# Patient Record
Sex: FEMALE | Race: ASIAN | NOT HISPANIC OR LATINO | Employment: FULL TIME
[De-identification: names, ages, dates, MRNs, and addresses within clinical notes are randomized per-mention and may not be internally consistent; named-entity substitution may affect disease eponyms.]

---

## 2017-09-10 ENCOUNTER — HEALTH MAINTENANCE LETTER (OUTPATIENT)
Age: 47
End: 2017-09-10

## 2019-08-21 ENCOUNTER — OFFICE VISIT (OUTPATIENT)
Dept: URGENT CARE | Facility: URGENT CARE | Age: 49
End: 2019-08-21
Payer: COMMERCIAL

## 2019-08-21 VITALS
OXYGEN SATURATION: 99 % | SYSTOLIC BLOOD PRESSURE: 118 MMHG | DIASTOLIC BLOOD PRESSURE: 68 MMHG | TEMPERATURE: 97.4 F | HEART RATE: 71 BPM | RESPIRATION RATE: 16 BRPM

## 2019-08-21 DIAGNOSIS — R42 VERTIGO: Primary | ICD-10-CM

## 2019-08-21 PROCEDURE — 99203 OFFICE O/P NEW LOW 30 MIN: CPT | Performed by: FAMILY MEDICINE

## 2019-08-21 RX ORDER — MECLIZINE HYDROCHLORIDE 25 MG/1
25 TABLET ORAL 3 TIMES DAILY PRN
Qty: 15 TABLET | Refills: 0 | Status: SHIPPED | OUTPATIENT
Start: 2019-08-21

## 2019-08-21 ASSESSMENT — ENCOUNTER SYMPTOMS
FEVER: 0
WEAKNESS: 0
SEIZURES: 0
BLOOD IN STOOL: 0
LIGHT-HEADEDNESS: 0
NAUSEA: 1
SHORTNESS OF BREATH: 0
ABDOMINAL PAIN: 0
SPEECH DIFFICULTY: 0
SINUS PAIN: 0
PALPITATIONS: 0
SINUS PRESSURE: 0
VOMITING: 0
SORE THROAT: 0
NUMBNESS: 0

## 2019-08-21 NOTE — PATIENT INSTRUCTIONS
Symptomatic cares and OTC medications, only as discussed.    Meclizine (or Dramamine) as needed for dizziness/vertigo, only as discussed.    Avoid antihistamines (e.g. Benadryl) and driving within 8 hours of taking Meclizine, as discussed.    Follow-up if worsening symptoms or if not improving over the next 2-3 days.    Follow-up in the ER immediately if emergent symptoms develop, such as: chest pain, shortness of breath, stroke-like symptoms (e.g. weakness), or severe/worsening headache.

## 2019-08-21 NOTE — PROGRESS NOTES
"SUBJECTIVE:   Payton Piña is a 48 year old female presenting with a chief complaint of   Chief Complaint   Patient presents with     Urgent Care     Headache     headache poss vertigo X3 day      She is a new patient of [facility].    Vertigo HPI:      Patient has been experiencing vertigo since Monday (3 days ago), which is responsive to Dramamine.    Vertigo is described as \"the whole world moving or flipping\".    Vertigo is mild, 3-4/10 severity and constant today, but it was intermittent (related to movement and position changes) previously.    Vertigo is worse with repositioning maneuvers, as looked up on line.    Mild headache in the morning the past 3 days.    Head just feels a bit heavy currently, but patient denies current headache.    Patient works at [employer], with computer work required.    Patient drank a lot of caffeine Monday, currently trying to cut down on caffeine intake.    Patient had a few alcoholic beverages the night before her vertigo started.    No tobacco or drug use reported.    No CAD risk factors, outside of her Family History.    Review of Systems   Constitutional: Negative for fever.   HENT: Positive for tinnitus (Possible.). Negative for congestion, dental problem, ear pain, hearing loss, sinus pressure, sinus pain and sore throat.    Respiratory: Negative for shortness of breath.    Cardiovascular: Negative for chest pain, palpitations and leg swelling.   Gastrointestinal: Positive for nausea (Yesterday). Negative for abdominal pain, blood in stool and vomiting.   Skin: Negative for rash.   Neurological: Negative for seizures, syncope, speech difficulty, weakness, light-headedness and numbness.   LMP 8/9/2019.    Patient Active Problem List   Diagnosis     Osteopenia       History reviewed. No pertinent past medical history.    No Known Allergies    Family History   Problem Relation Age of Onset     Coronary Artery Disease Mother         Age 60+ stent     Breast Cancer " Mother         Age 60,  age 72.     Cerebrovascular Disease Maternal Grandmother        Current Outpatient Medications   Medication Sig Dispense Refill     Calcium and Vitamin D        melatonin 1 MG TABS tablet Take 1-3 tablets (1-3 mg) by mouth nightly as needed for sleep         Social History     Tobacco Use     Smoking status: Never Smoker     Smokeless tobacco: Never Used   Substance Use Topics     Alcohol use: Not on file     Comment: Up to 9 drinks/week.       OBJECTIVE  /68   Pulse 71   Temp 97.4  F (36.3  C)   Resp 16   SpO2 99%     Physical Exam    GENERAL APPEARANCE:  Awake and alert.  PSYCHIATRIC:  Pleasant affect.  HEENT:  Sclera anicteric.  PERRLA.  Extraocular movements intact.  No photophobia.  Bilateral TM's and canals within normal limits.  No obvious nasal congestion.  No sinus tenderness.  Tongue protrudes midline.  No erythema, edema, or exudates of the oral mucosa or posterior pharynx.  Mucous membranes moist.  NECK:  Spontaneous full range of motion.  No thyromegaly or mass.  No lymphadenopathy.  No nuchal rigidity or tenderness.  HEART:  Normal S1, S2.  Regular rate and rhythm.  No murmurs, rubs, or gallops.  LUNGS:  No respiratory distress.  No wheezes, rales, or rhonchi.  ABDOMEN:   Not distended.  EXTREMITIES:  Moves 4 extremities symmetrically.  Intact strength and sensation noted.   No edema.  NEUROLOGIC:  Cranial nerves II-XII grossly intact.  Reflexes 2/5 and symmetric.  Gait within normal limits.  Heel-toe walk is only slightly unsteady, without lateralization.  SKIN:  No rash.    Labs:  No results found for this or any previous visit (from the past 24 hour(s)).    ASSESSMENT:      ICD-10-CM    1. Vertigo R42 meclizine (ANTIVERT) 25 MG tablet      Suspect benign positional vertigo.  Differential was considered, including viral process.  Doubt CVA, given absence of patient risk factors.    PLAN:    Patient Instructions     Symptomatic cares and OTC medications, only as  discussed.    Meclizine (or Dramamine) as needed for dizziness/vertigo, only as discussed.    Avoid antihistamines (e.g. Benadryl) and driving within 8 hours of taking Meclizine, as discussed.    Follow-up if worsening symptoms or if not improving over the next 2-3 days.    Follow-up in the ER immediately if emergent symptoms develop, such as: chest pain, shortness of breath, stroke-like symptoms (e.g. weakness), or severe/worsening headache.    Discussed risks and benefits of treatment strategies, as noted in the Assessment and Plan sections.    The patient was discharged ambulatory and in stable condition post discussion of follow up.     The above dictation was composed using Dragon software.    Tori Barreto MD

## 2019-11-08 ENCOUNTER — HEALTH MAINTENANCE LETTER (OUTPATIENT)
Age: 49
End: 2019-11-08

## 2020-02-23 ENCOUNTER — HEALTH MAINTENANCE LETTER (OUTPATIENT)
Age: 50
End: 2020-02-23

## 2020-09-17 ENCOUNTER — TELEPHONE (OUTPATIENT)
Dept: PEDIATRICS | Facility: CLINIC | Age: 50
End: 2020-09-17

## 2020-12-06 ENCOUNTER — HEALTH MAINTENANCE LETTER (OUTPATIENT)
Age: 50
End: 2020-12-06

## 2021-02-20 ENCOUNTER — HEALTH MAINTENANCE LETTER (OUTPATIENT)
Age: 51
End: 2021-02-20

## 2021-03-31 ENCOUNTER — IMMUNIZATION (OUTPATIENT)
Dept: NURSING | Facility: CLINIC | Age: 51
End: 2021-03-31
Payer: COMMERCIAL

## 2021-03-31 PROCEDURE — 0001A PR COVID VAC PFIZER DIL RECON 30 MCG/0.3 ML IM: CPT

## 2021-03-31 PROCEDURE — 91300 PR COVID VAC PFIZER DIL RECON 30 MCG/0.3 ML IM: CPT

## 2021-04-21 ENCOUNTER — IMMUNIZATION (OUTPATIENT)
Dept: NURSING | Facility: CLINIC | Age: 51
End: 2021-04-21
Attending: INTERNAL MEDICINE
Payer: COMMERCIAL

## 2021-04-21 PROCEDURE — 91300 PR COVID VAC PFIZER DIL RECON 30 MCG/0.3 ML IM: CPT

## 2021-04-21 PROCEDURE — 0002A PR COVID VAC PFIZER DIL RECON 30 MCG/0.3 ML IM: CPT

## 2021-09-25 ENCOUNTER — HEALTH MAINTENANCE LETTER (OUTPATIENT)
Age: 51
End: 2021-09-25

## 2021-11-15 ENCOUNTER — IMMUNIZATION (OUTPATIENT)
Dept: NURSING | Facility: CLINIC | Age: 51
End: 2021-11-15
Payer: COMMERCIAL

## 2021-11-15 PROCEDURE — 91300 PR COVID VAC PFIZER DIL RECON 30 MCG/0.3 ML IM: CPT

## 2021-11-15 PROCEDURE — 0004A PR COVID VAC PFIZER DIL RECON 30 MCG/0.3 ML IM: CPT

## 2021-11-16 ENCOUNTER — E-VISIT (OUTPATIENT)
Dept: URGENT CARE | Facility: URGENT CARE | Age: 51
End: 2021-11-16
Payer: COMMERCIAL

## 2021-11-16 DIAGNOSIS — Z20.822 SUSPECTED COVID-19 VIRUS INFECTION: Primary | ICD-10-CM

## 2021-11-16 PROCEDURE — 99421 OL DIG E/M SVC 5-10 MIN: CPT | Performed by: FAMILY MEDICINE

## 2021-11-16 NOTE — PATIENT INSTRUCTIONS
Dear Payton Piña,    Your symptoms show that you may have coronavirus (COVID-19). This illness can cause fever, cough and trouble breathing. Many people get a mild case and get better on their own. Some people can get very sick.    Will I be tested for COVID-19?  We would like to test you for Covid-19 virus. I have placed orders for this test.     To schedule: go to your [employer] home page and scroll down to the section that says  You have an appointment that needs to be scheduled  and click the large green button that says  Schedule Now  and follow the steps to find the next available openings.    If you are unable to complete these [employer] scheduling steps, please call [phone] to schedule your testing.     Return to work/school/ guidance:  Please let your workplace manager and staffing office know when your quarantine ends     We can t give you an exact date as it depends on the above. You can calculate this on your own or work with your manager/staffing office to calculate this. (For example if you were exposed on 10/4, you would have to quarantine for 14 full days. That would be through 10/18. You could return on 10/19.)      If you receive a positive COVID-19 test result, follow the guidance of the those who are giving you the results. Usually the return to work is 10 (or in some cases 20 days from symptom onset.) If you work at [employer], you must also be cleared by Employee Occupational Health and Safety to return to work.        If you receive a negative COVID-19 test result and did not have a high risk exposure to someone with a known positive COVID-19 test, you can return to work once you're free of fever for 24 hours without fever-reducing medication and your symptoms are improving or resolved.      If you receive a negative COVID-19 test and If you had a high risk exposure to someone who has tested positive for COVID-19 then you can return to work 14 days after your last contact  with the positive individual    Note: If you have ongoing exposure to the covid positive person, this quarantine period may be more than 14 days. (For example, if you are continued to be exposed to your child who tested positive and cannot isolate from them, then the quarantine of 7-14 days can't start until your child is no longer contagious. This is typically 10 days from onset of the child's symptoms. So the total duration may be 17-24 days in this case.)    Sign up for [employer].   We know it's scary to hear that you might have COVID-19. We want to track your symptoms to make sure you're okay over the next 2 weeks. Please look for an email from [employer]--this is a free, online program that we'll use to keep in touch. To sign up, follow the link in the email you will receive. Learn more at [email or web address]    How can I take care of myself?    Get lots of rest. Drink extra fluids (unless a doctor has told you not to)    Take Tylenol (acetaminophen) or ibuprofen for fever or pain. If you have liver or kidney problems, ask your family doctor if it's okay to take Tylenol o ibuprofen    If you have other health problems (like cancer, heart failure, an organ transplant or severe kidney disease): Call your specialty clinic if you don't feel better in the next 2 days.    Know when to call 911. Emergency warning signs include:  o Trouble breathing or shortness of breath  o Pain or pressure in the chest that doesn't go away  o Feeling confused like you haven't felt before, or not being able to wake up  o Bluish-colored lips or face    Where can I get more information?  [facility] [facility] - About COVID-19:   [email or web address]    CDC - What to Do If You're Sick:   www.cdc.gov/coronavirus/2019-ncov/about/steps-when-sick.html

## 2021-11-17 ENCOUNTER — LAB (OUTPATIENT)
Dept: URGENT CARE | Facility: URGENT CARE | Age: 51
End: 2021-11-17
Attending: FAMILY MEDICINE
Payer: COMMERCIAL

## 2021-11-17 DIAGNOSIS — Z20.822 SUSPECTED COVID-19 VIRUS INFECTION: ICD-10-CM

## 2021-11-17 PROCEDURE — U0003 INFECTIOUS AGENT DETECTION BY NUCLEIC ACID (DNA OR RNA); SEVERE ACUTE RESPIRATORY SYNDROME CORONAVIRUS 2 (SARS-COV-2) (CORONAVIRUS DISEASE [COVID-19]), AMPLIFIED PROBE TECHNIQUE, MAKING USE OF HIGH THROUGHPUT TECHNOLOGIES AS DESCRIBED BY CMS-2020-01-R: HCPCS

## 2021-11-17 PROCEDURE — U0005 INFEC AGEN DETEC AMPLI PROBE: HCPCS

## 2021-11-18 LAB — SARS-COV-2 RNA RESP QL NAA+PROBE: POSITIVE

## 2022-01-15 ENCOUNTER — HEALTH MAINTENANCE LETTER (OUTPATIENT)
Age: 52
End: 2022-01-15

## 2022-03-12 ENCOUNTER — HEALTH MAINTENANCE LETTER (OUTPATIENT)
Age: 52
End: 2022-03-12

## 2022-05-11 ENCOUNTER — IMMUNIZATION (OUTPATIENT)
Dept: NURSING | Facility: CLINIC | Age: 52
End: 2022-05-11
Payer: COMMERCIAL

## 2022-05-11 PROCEDURE — 0054A COVID-19,PF,PFIZER (12+ YRS): CPT

## 2022-05-11 PROCEDURE — 91305 COVID-19,PF,PFIZER (12+ YRS): CPT

## 2023-01-07 ENCOUNTER — HEALTH MAINTENANCE LETTER (OUTPATIENT)
Age: 53
End: 2023-01-07

## 2023-03-08 ENCOUNTER — ALLIED HEALTH/NURSE VISIT (OUTPATIENT)
Dept: PEDIATRICS | Facility: CLINIC | Age: 53
End: 2023-03-08
Payer: COMMERCIAL

## 2023-03-08 DIAGNOSIS — Z23 NEED FOR VACCINATION: Primary | ICD-10-CM

## 2023-03-08 PROCEDURE — 91312 COVID-19 VACCINE BIVALENT BOOSTER 12+ (PFIZER): CPT

## 2023-03-08 PROCEDURE — 99207 PR NO CHARGE NURSE ONLY: CPT

## 2023-03-08 PROCEDURE — 0124A COVID-19 VACCINE BIVALENT BOOSTER 12+ (PFIZER): CPT

## 2024-02-10 ENCOUNTER — HEALTH MAINTENANCE LETTER (OUTPATIENT)
Age: 54
End: 2024-02-10

## 2025-03-02 ENCOUNTER — HEALTH MAINTENANCE LETTER (OUTPATIENT)
Age: 55
End: 2025-03-02

## 2025-03-09 ENCOUNTER — OFFICE VISIT (OUTPATIENT)
Dept: URGENT CARE | Facility: URGENT CARE | Age: 55
End: 2025-03-09
Payer: COMMERCIAL

## 2025-03-09 VITALS
RESPIRATION RATE: 18 BRPM | SYSTOLIC BLOOD PRESSURE: 95 MMHG | OXYGEN SATURATION: 98 % | TEMPERATURE: 98.6 F | HEART RATE: 82 BPM | DIASTOLIC BLOOD PRESSURE: 59 MMHG | WEIGHT: 151 LBS

## 2025-03-09 DIAGNOSIS — R50.9 FEVER, UNSPECIFIED: Primary | ICD-10-CM

## 2025-03-09 DIAGNOSIS — R05.1 ACUTE COUGH: ICD-10-CM

## 2025-03-09 LAB
DEPRECATED S PYO AG THROAT QL EIA: NEGATIVE
FLUAV AG SPEC QL IA: NEGATIVE
FLUBV AG SPEC QL IA: NEGATIVE

## 2025-03-09 PROCEDURE — 3078F DIAST BP <80 MM HG: CPT | Performed by: FAMILY MEDICINE

## 2025-03-09 PROCEDURE — 87651 STREP A DNA AMP PROBE: CPT | Performed by: FAMILY MEDICINE

## 2025-03-09 PROCEDURE — 87804 INFLUENZA ASSAY W/OPTIC: CPT

## 2025-03-09 PROCEDURE — 87635 SARS-COV-2 COVID-19 AMP PRB: CPT | Performed by: FAMILY MEDICINE

## 2025-03-09 PROCEDURE — 3074F SYST BP LT 130 MM HG: CPT | Performed by: FAMILY MEDICINE

## 2025-03-09 PROCEDURE — 99203 OFFICE O/P NEW LOW 30 MIN: CPT | Performed by: FAMILY MEDICINE

## 2025-03-09 NOTE — PROGRESS NOTES
SUBJECTIVE:   Payton Piña is a 54 year old female presenting with a chief complaint of cough (dry coughs), fever (up to 100.5 F), body aches    No shortness of breath  No nasal symptoms.    No ear problems  There has been a sore throat.    No vomiting  No diarrhea.   No rashes.      Onset of symptoms was two days ago    Course of illness is about the same.  .    Current and Associated symptoms: as listed above.    Treatment measures tried include Ibuprofen 400 mg.  Predisposing factors include her daughter has the same symptoms.  .    Past Medical History:  No major medical problems.     Current Outpatient Medications   Medication Sig Dispense Refill    meclizine (ANTIVERT) 25 MG tablet Take 1 tablet (25 mg) by mouth 3 times daily as needed for dizziness or nausea 15 tablet 0    melatonin 1 MG TABS tablet Take 1-3 tablets (1-3 mg) by mouth nightly as needed for sleep       Social History     Tobacco Use    Smoking status: Never    Smokeless tobacco: Never   Substance Use Topics    Alcohol use: Not on file     Comment: Up to 9 drinks/week.       ROS:  CONSTITUTIONAL:NEGATIVE for fever, chills,  HENT:  positive for sore throat.   RESP:  positive for cough.  No shortness of breath.      OBJECTIVE:  BP 95/59 (BP Location: Right arm, Patient Position: Sitting, Cuff Size: Adult Regular)   Pulse 82   Temp 98.6  F (37  C) (Temporal)   Resp 18   Wt 68.5 kg (151 lb)   LMP  (LMP Unknown)   SpO2 98%   GENERAL APPEARANCE: healthy, alert and no distress.  There is an occasional dry cough present.    HENT: TM's normal bilaterally, nasal turbinates erythematous, swollen, and oropharynx is mildly erythematous without exudates.    NECK: supple, nontender, no lymphadenopathy  RESP: lungs clear to auscultation - no rales, rhonchi or wheezes  CV: regular rates and rhythm, normal S1 S2, no murmur noted  SKIN: no cyanosis.  No diaphoresis.      LAB:  Results for orders placed or performed in visit on 03/09/25   Streptococcus A  Rapid Screen w/Reflex to PCR - Clinic Collect     Status: Normal    Specimen: Throat; Swab   Result Value Ref Range    Group A Strep antigen Negative Negative   Influenza A/B antigen     Status: Normal    Specimen: Nasopharyngeal; Swab   Result Value Ref Range    Influenza A antigen Negative Negative    Influenza B antigen Negative Negative    Narrative    Test results must be correlated with clinical data. If necessary, results should be confirmed by a molecular assay or viral culture.       ASSESSMENT:  Acute Cough  Fever in Adult    PLAN:  Take Ibuprofen or Tylenol for fevers/body aches.      Get plenty of rest    Drink plenty of liquids.      Follow up if not better in 8-10 days.      Pending lab:  Strep PCR Test, COVID-19 PCR Test.      Too Mckinney MD

## 2025-03-09 NOTE — PATIENT INSTRUCTIONS
Take Ibuprofen or Tylenol for fevers/body aches.      Get plenty of rest    Drink plenty of liquids.      Follow up if not better in 8-10 days.

## 2025-03-10 ENCOUNTER — E-VISIT (OUTPATIENT)
Dept: URGENT CARE | Facility: CLINIC | Age: 55
End: 2025-03-10
Payer: COMMERCIAL

## 2025-03-10 DIAGNOSIS — J02.9 PHARYNGITIS, UNSPECIFIED ETIOLOGY: Primary | ICD-10-CM

## 2025-03-10 LAB
S PYO DNA THROAT QL NAA+PROBE: NOT DETECTED
SARS-COV-2 RNA RESP QL NAA+PROBE: NEGATIVE

## 2025-03-10 PROCEDURE — 99207 PR NON-BILLABLE SERV PER CHARTING: CPT | Performed by: NURSE PRACTITIONER

## 2025-03-10 NOTE — PATIENT INSTRUCTIONS
Dear Payton,    I see that you had a strep test completed yesterday that was negative. It is most likely viral in nature, but I am happy to order another strep test if you would like.     After reviewing your responses, I would like you to come in for a swab to make sure we treat you correctly. This swab is to evaluate you for possible Strep Throat, and should be scheduled for today or tomorrow. Please use the Schedule Now button in [employer] to schedule your swab. Otherwise, click this link to schedule a lab only appointment.    Lab appointments are not available at most locations on the weekends. If no Lab Only appointment is available, you should be seen in any of our convenient Urgent Care Centers for an in person visit, which can be found on our website here.    You will receive instructions with your results in [employer] once they are available.     If your symptoms worsen, you develop difficulty breathing, difficulty with drinking enough to stay hydrated, difficulty swallowing your saliva or have fevers for more than 5 days, please contact your primary care provider for an appointment or visit an Urgent Care Center to be seen.      Thanks again for choosing us as your health care partner.   SIMON HENRY CNP  Sore Throat: Care Instructions  Overview     Infection by bacteria or a virus causes most sore throats. Cigarette smoke, dry air, air pollution, allergies, and yelling can also cause a sore throat. Sore throats can be painful and annoying. Fortunately, most sore throats go away on their own. If you have a bacterial infection, your doctor may prescribe antibiotics.  Follow-up care is a key part of your treatment and safety. Be sure to make and go to all appointments, and call your doctor if you are having problems. It's also a good idea to know your test results and keep a list of the medicines you take.  How can you care for yourself at home?  If your doctor prescribed antibiotics, take them as  "directed. Do not stop taking them just because you feel better. You need to take the full course of antibiotics.  Gargle with warm salt water several times a day to help reduce swelling and relieve pain. Mix 1/2 teaspoon of salt in 1 cup of warm water.  Take an over-the-counter pain medicine, such as acetaminophen (Tylenol), ibuprofen (Advil, Motrin), or naproxen (Aleve). Read and follow all instructions on the label.  Be careful when taking over-the-counter cold or flu medicines and Tylenol at the same time. Many of these medicines have acetaminophen, which is Tylenol. Read the labels to make sure that you are not taking more than the recommended dose. Too much acetaminophen (Tylenol) can be harmful.  Drink plenty of fluids. Fluids may help soothe an irritated throat. Hot fluids, such as tea or soup, may help decrease throat pain.  Use over-the-counter throat lozenges to soothe pain. Regular cough drops or hard candy may also help. These should not be given to young children because of the risk of choking.  Do not smoke or allow others to smoke around you. If you need help quitting, talk to your doctor about stop-smoking programs and medicines. These can increase your chances of quitting for good.  Use a vaporizer or humidifier to add moisture to your bedroom. Follow the directions for cleaning the machine.  When should you call for help?   Call your doctor now or seek immediate medical care if:    You have trouble breathing.     Your sore throat gets much worse on one side.     You have new or worse trouble swallowing.     You have a new or higher fever.   Watch closely for changes in your health, and be sure to contact your doctor if you do not get better as expected.  Where can you learn more?  Go to https://www.[employer].net/patiented  Enter U420 in the search box to learn more about \"Sore Throat: Care Instructions.\"  Current as of: September 27, 2023  Content Version: 14.3    2024 [employer]. "   Care instructions adapted under license by your healthcare professional. If you have questions about a medical condition or this instruction, always ask your healthcare professional. [employer] disclaims any warranty or liability for your use of this information.

## 2025-03-11 ENCOUNTER — LAB (OUTPATIENT)
Dept: LAB | Facility: CLINIC | Age: 55
End: 2025-03-11
Attending: NURSE PRACTITIONER
Payer: COMMERCIAL

## 2025-03-11 DIAGNOSIS — J02.9 PHARYNGITIS, UNSPECIFIED ETIOLOGY: ICD-10-CM

## 2025-03-11 LAB
DEPRECATED S PYO AG THROAT QL EIA: NEGATIVE
S PYO DNA THROAT QL NAA+PROBE: NOT DETECTED

## 2025-03-11 PROCEDURE — 87651 STREP A DNA AMP PROBE: CPT
